# Patient Record
(demographics unavailable — no encounter records)

---

## 2024-11-08 NOTE — DISCUSSION/SUMMARY
[FreeTextEntry1] : 31 year old woman  who delivered 24 at 39 weeks. Developed GHTN. Here for evaluation of her BP- was put on Nifedipine 30 mg daily and was transiently doubled and went back to 30 mg daily. Still taking the nifedipine Occasionally gets headache that she attributes to the medication BP is ok- but diastolic BP still high at times Continue Nifedipine 30 mg daily---will call next week with numbers FU in 2 months [EKG obtained to assist in diagnosis and management of assessed problem(s)] : EKG obtained to assist in diagnosis and management of assessed problem(s)

## 2024-11-08 NOTE — PHYSICAL EXAM
[Well Developed] : well developed [Well Nourished] : well nourished [No Acute Distress] : no acute distress [Normal Conjunctiva] : normal conjunctiva [Normal Venous Pressure] : normal venous pressure [No Carotid Bruit] : no carotid bruit [Normal S1, S2] : normal S1, S2 [No Rub] : no rub [No Murmur] : no murmur [No Gallop] : no gallop [Clear Lung Fields] : clear lung fields [Good Air Entry] : good air entry [No Respiratory Distress] : no respiratory distress  [Soft] : abdomen soft [Non Tender] : non-tender [No Masses/organomegaly] : no masses/organomegaly [Normal Bowel Sounds] : normal bowel sounds [Normal Gait] : normal gait [No Edema] : no edema [No Cyanosis] : no cyanosis [No Clubbing] : no clubbing [No Varicosities] : no varicosities [No Rash] : no rash [No Skin Lesions] : no skin lesions [Moves all extremities] : moves all extremities [No Focal Deficits] : no focal deficits [Normal Speech] : normal speech [Normal memory] : normal memory [Alert and Oriented] : alert and oriented

## 2024-11-08 NOTE — HISTORY OF PRESENT ILLNESS
[FreeTextEntry1] : 31 year old woman  who delivered 24 at 39 weeks. Developed GHTN. Here for evaluation of her BP- was put on Nifedipine 30 mg daily and was transiently doubled and went back to 30 mg daily. Still taking the nifedipine Occasionally gets headache that she attributes to the medication BP is ok- but diastolic BP still high at times

## 2025-02-03 NOTE — DISCUSSION/SUMMARY
[de-identified] : ABHI PETERSON is a 31 year old right-hand-dominant 4 months postpartum female presents today with 4-month history of acute on chronic right sided wrist and thumb pain likely due to first dorsal compartment tenosynovitis.  Discussed with patient this is likely due to lifting and carrying her 4-month-old at home.  Reassurance given this is likely to improve with conservative measures.  Plan: 1.  Recommended thumb spica brace instead of neutral wrist brace 2.  Recommend Voltaren gel 3.  Recommend over-the-counter Tylenol and NSAIDs as needed 4.  Patient will follow-up in 3 months if no improvement can consider formal occupational therapy versus cortisone injection.

## 2025-02-03 NOTE — PHYSICAL EXAM
[de-identified] : Hand/wrist/finger (right)  Inspection  Swelling: none  Ecchymosis: none  Scissoring: none    Palpation  Tenderness: Mild-moderate Location: First dorsal compartment  Wrist Flexion  Normal.  Wrist Extension  Normal.  Wrist Radial Deviation  Normal.  Wrist Ulnar Deviation  Normal.   Wrist/Pinch/ Motor Strength  Wrist Extension: 5/5 Wrist Flexion: 5/5  : 5/5   Finger Flexion  Normal.  Finger Extension  Normal.  Sensory index  Normal   Special Tests   Finkelsteins: Positive Tinnels: normal  Phalens: normal  Pinch : normal

## 2025-02-03 NOTE — HISTORY OF PRESENT ILLNESS
[de-identified] : ABHI PETERSON is a 31 year old female presenting with approximately 4-month history of right greater than left wrist and thumb pain.  Patient is 4 months postpartum and has a 4-month-old infant at home.  She states overall the left wrist and thumb pain has resolved however she is still in moderate to significant right sided pain.  She initially told her OB/GYN who stated this would improve on its own however it has not happened to this point and was referred to a specialist.  She initially tried a wrist brace however this only cover the wrist and not extend into the thumb and was not helpful.  She is here today for further evaluation.